# Patient Record
Sex: MALE | Race: BLACK OR AFRICAN AMERICAN | Employment: UNEMPLOYED | URBAN - METROPOLITAN AREA
[De-identification: names, ages, dates, MRNs, and addresses within clinical notes are randomized per-mention and may not be internally consistent; named-entity substitution may affect disease eponyms.]

---
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
## 2024-01-08 ENCOUNTER — HOSPITAL ENCOUNTER (OUTPATIENT)
Age: 19
Discharge: HOME | End: 2024-01-08
Payer: MEDICAID

## 2024-01-08 DIAGNOSIS — D69.6 THROMBOCYTOPENIA (HCC): ICD-10-CM

## 2024-01-08 LAB
BASOPHILS # BLD: 0.03 K/UL (ref 0–0.2)
BASOPHILS NFR BLD: 1 % (ref 0–2)
EOSINOPHIL # BLD: <0.03 K/UL (ref 0–0.44)
EOSINOPHILS RELATIVE PERCENT: 1 % (ref 1–4)
ERYTHROCYTE [DISTWIDTH] IN BLOOD BY AUTOMATED COUNT: 12.8 % (ref 11.8–14.4)
HCT VFR BLD AUTO: 42.9 % (ref 40.7–50.3)
HGB BLD-MCNC: 14.5 G/DL (ref 13–17)
IMM GRANULOCYTES # BLD AUTO: <0.03 K/UL (ref 0–0.3)
IMM GRANULOCYTES NFR BLD: 0 %
LYMPHOCYTES NFR BLD: 1.95 K/UL (ref 1.2–5.2)
LYMPHOCYTES RELATIVE PERCENT: 47 % (ref 25–45)
MCH RBC QN AUTO: 31.5 PG (ref 25.2–33.5)
MCHC RBC AUTO-ENTMCNC: 33.8 G/DL (ref 28.4–34.8)
MCV RBC AUTO: 93.3 FL (ref 82.6–102.9)
MONOCYTES NFR BLD: 0.37 K/UL (ref 0.1–1.4)
MONOCYTES NFR BLD: 9 % (ref 2–8)
NEUTROPHILS NFR BLD: 42 % (ref 34–64)
NEUTS SEG NFR BLD: 1.74 K/UL (ref 1.8–8)
NRBC BLD-RTO: 0 PER 100 WBC
PLATELET # BLD AUTO: 97 K/UL (ref 138–453)
PMV BLD AUTO: 9.9 FL (ref 8.1–13.5)
RBC # BLD AUTO: 4.6 M/UL (ref 4.21–5.77)
WBC OTHER # BLD: 4.1 K/UL (ref 4.5–13.5)

## 2024-01-08 PROCEDURE — 36415 COLL VENOUS BLD VENIPUNCTURE: CPT

## 2024-01-08 PROCEDURE — 85025 COMPLETE CBC W/AUTO DIFF WBC: CPT

## 2024-01-15 ENCOUNTER — TELEPHONE (OUTPATIENT)
Dept: ONCOLOGY | Age: 19
End: 2024-01-15

## 2024-01-15 NOTE — TELEPHONE ENCOUNTER
Spoke with PT and pt is going to call the office back.  Need to schedule a f/u appt with Dr Wasserman with [facility].   would like to see pt around the 31st of January.    Electronically signed by Lauren Vences on 1/15/2024 at 9:34 AM

## 2024-01-25 DIAGNOSIS — E53.8 B12 DEFICIENCY: ICD-10-CM

## 2024-01-25 DIAGNOSIS — D69.6 THROMBOCYTOPENIA (HCC): Primary | ICD-10-CM

## 2024-02-23 ENCOUNTER — TELEPHONE (OUTPATIENT)
Dept: ONCOLOGY | Age: 19
End: 2024-02-23

## 2024-02-23 ENCOUNTER — HOSPITAL ENCOUNTER (OUTPATIENT)
Age: 19
Discharge: HOME | End: 2024-02-23
Payer: MEDICAID

## 2024-02-23 ENCOUNTER — OFFICE VISIT (OUTPATIENT)
Dept: ONCOLOGY | Age: 19
End: 2024-02-23
Payer: MEDICAID

## 2024-02-23 VITALS
OXYGEN SATURATION: 99 % | HEART RATE: 82 BPM | SYSTOLIC BLOOD PRESSURE: 103 MMHG | WEIGHT: 118.7 LBS | BODY MASS INDEX: 17.03 KG/M2 | DIASTOLIC BLOOD PRESSURE: 53 MMHG | TEMPERATURE: 97.6 F

## 2024-02-23 DIAGNOSIS — D69.6 THROMBOCYTOPENIA (HCC): ICD-10-CM

## 2024-02-23 DIAGNOSIS — E53.8 B12 DEFICIENCY: ICD-10-CM

## 2024-02-23 DIAGNOSIS — D69.6 THROMBOCYTOPENIA (HCC): Primary | ICD-10-CM

## 2024-02-23 LAB
BASOPHILS # BLD: 0 K/UL (ref 0–0.2)
BASOPHILS NFR BLD: 1 % (ref 0–2)
EOSINOPHIL # BLD: 0 K/UL (ref 0–0.4)
EOSINOPHILS RELATIVE PERCENT: 1 % (ref 1–4)
ERYTHROCYTE [DISTWIDTH] IN BLOOD BY AUTOMATED COUNT: 13.6 % (ref 12.5–15.4)
HCT VFR BLD AUTO: 38.7 % (ref 41–53)
HGB BLD-MCNC: 13.4 G/DL (ref 13.5–17.5)
LYMPHOCYTES NFR BLD: 1.4 K/UL (ref 1.2–5.2)
LYMPHOCYTES RELATIVE PERCENT: 34 % (ref 25–45)
MCH RBC QN AUTO: 31.9 PG (ref 26–34)
MCHC RBC AUTO-ENTMCNC: 34.6 G/DL (ref 31–37)
MCV RBC AUTO: 92 FL (ref 80–100)
MONOCYTES NFR BLD: 0.3 K/UL (ref 0.1–1.4)
MONOCYTES NFR BLD: 8 % (ref 2–8)
NEUTROPHILS NFR BLD: 56 % (ref 34–64)
NEUTS SEG NFR BLD: 2.4 K/UL (ref 1.8–8)
PLATELET # BLD AUTO: 102 K/UL (ref 140–450)
PMV BLD AUTO: 7.5 FL (ref 6–12)
RBC # BLD AUTO: 4.2 M/UL (ref 4.5–5.9)
WBC OTHER # BLD: 4.3 K/UL (ref 4.5–13.5)

## 2024-02-23 PROCEDURE — 99214 OFFICE O/P EST MOD 30 MIN: CPT | Performed by: INTERNAL MEDICINE

## 2024-02-23 PROCEDURE — 36415 COLL VENOUS BLD VENIPUNCTURE: CPT

## 2024-02-23 PROCEDURE — 99211 OFF/OP EST MAY X REQ PHY/QHP: CPT | Performed by: INTERNAL MEDICINE

## 2024-02-23 PROCEDURE — 85025 COMPLETE CBC W/AUTO DIFF WBC: CPT

## 2024-02-23 NOTE — TELEPHONE ENCOUNTER
AVS from 2/23/24      Rv in 3 months with [facility]      Rv at  [facility]       *rv will be sched @[facility] w [facility]   *AVS faxed to location pt givein office number      PT was given AVS

## 2024-02-23 NOTE — PROGRESS NOTES
Patient Name: Gina French    Patient's age: 18 y.o., 2005      CHIEF COMPLAINT:  Review results.  Thrombocytopenia    History Obtained From:  patient, mother, electronic medical record    Interval history:  Patient presents to the clinic accompanied by his mother to discuss results of his blood workup.  Patient CBC shows platelet count 102,000.  WBC count on lower side.  Denies excessive bruising or bleeding.    During this visit patient's allergy, social, medical, surgical history and medications were reviewed and updated.    HISTORY OF PRESENT ILLNESS:      The patient is a 18 y.o. Afro-American male who initially presented to the ER with complaints of 3 to 4 days of nausea vomiting generalized abdominal pain and diarrhea.  Patient denies fever or shortness of breath.  Patient initially seen in the ER discharged home once patient was feeling better however once patient's labs came back patient was noted to have a platelet count of 19,000 with immature platelet fraction of 11.5%.  Subsequently patient was advised to come back to the ER.  Patient now admitted to the hospital.  Oncology team consulted for management of severe thrombocytopenia.    Patient denies drenching night sweats swollen glands unintentional weight loss.  Repeat platelet count this morning is 20,000.  Peripheral blood smear is pending.  Records from outside facility including CBC from September 2022 shows platelet count of 67,000 at that time.    Past Medical History: Denies history of diabetes or hypertension.   Past Surgical History:   has a past surgical history that includes Circumcision.     Medications:    Prior to Admission medications    Medication Sig Start Date End Date Taking? Authorizing Provider   latanoprost (XALATAN) 0.005 % ophthalmic solution instill 1 drop into both eyes every evening 10/3/20  Yes Provider, MD Jamie   cyanocobalamin ([facility] VITAMIN B12) 1000 MCG tablet Take 1 tablet by mouth daily  Patient

## 2024-03-09 ENCOUNTER — HOSPITAL ENCOUNTER (EMERGENCY)
Age: 19
Discharge: HOME | End: 2024-03-09
Attending: EMERGENCY MEDICINE
Payer: MEDICAID

## 2024-03-09 VITALS
HEIGHT: 70 IN | RESPIRATION RATE: 16 BRPM | HEART RATE: 77 BPM | OXYGEN SATURATION: 99 % | DIASTOLIC BLOOD PRESSURE: 67 MMHG | WEIGHT: 156.75 LBS | BODY MASS INDEX: 22.44 KG/M2 | TEMPERATURE: 97.7 F | SYSTOLIC BLOOD PRESSURE: 103 MMHG

## 2024-03-09 DIAGNOSIS — R10.84 GENERALIZED ABDOMINAL PAIN: Primary | ICD-10-CM

## 2024-03-09 LAB
ALBUMIN SERPL-MCNC: 4.6 G/DL (ref 3.5–5.2)
ALBUMIN/GLOB SERPL: 1 {RATIO} (ref 1–2.5)
ALP SERPL-CCNC: 67 U/L (ref 40–129)
ALT SERPL-CCNC: 9 U/L (ref 10–50)
ANION GAP SERPL CALCULATED.3IONS-SCNC: 10 MMOL/L (ref 9–16)
AST SERPL-CCNC: 21 U/L (ref 10–50)
BILIRUB DIRECT SERPL-MCNC: 0.3 MG/DL (ref 0–0.3)
BILIRUB INDIRECT SERPL-MCNC: 0.8 MG/DL (ref 0–1)
BILIRUB SERPL-MCNC: 1.1 MG/DL (ref 0–1.2)
BUN SERPL-MCNC: 8 MG/DL (ref 6–20)
CALCIUM SERPL-MCNC: 9.7 MG/DL (ref 8.6–10.4)
CHLORIDE SERPL-SCNC: 102 MMOL/L (ref 98–107)
CO2 SERPL-SCNC: 27 MMOL/L (ref 20–31)
CREAT SERPL-MCNC: 1.1 MG/DL (ref 0.7–1.2)
ERYTHROCYTE [DISTWIDTH] IN BLOOD BY AUTOMATED COUNT: 12.6 % (ref 11.8–14.4)
FLUAV AG SPEC QL: NEGATIVE
FLUBV AG SPEC QL: NEGATIVE
GFR SERPL CREATININE-BSD FRML MDRD: >60 ML/MIN/1.73M2
GLOBULIN SER CALC-MCNC: 3.1 G/DL
GLUCOSE SERPL-MCNC: 86 MG/DL (ref 74–99)
HCT VFR BLD AUTO: 41.5 % (ref 40.7–50.3)
HGB BLD-MCNC: 14.3 G/DL (ref 13–17)
LIPASE SERPL-CCNC: 13 U/L (ref 13–60)
MCH RBC QN AUTO: 31.4 PG (ref 25.2–33.5)
MCHC RBC AUTO-ENTMCNC: 34.5 G/DL (ref 28.4–34.8)
MCV RBC AUTO: 91 FL (ref 82.6–102.9)
NRBC BLD-RTO: 0 PER 100 WBC
PLATELET # BLD AUTO: ABNORMAL K/UL (ref 138–453)
PLATELET, FLUORESCENCE: 92 K/UL (ref 138–453)
PLATELETS.RETICULATED NFR BLD AUTO: 4.8 % (ref 1.1–10.3)
POTASSIUM SERPL-SCNC: 4.2 MMOL/L (ref 3.7–5.3)
PROT SERPL-MCNC: 7.7 G/DL (ref 6.6–8.7)
RBC # BLD AUTO: 4.56 M/UL (ref 4.21–5.77)
SARS-COV-2 RDRP RESP QL NAA+PROBE: NOT DETECTED
SODIUM SERPL-SCNC: 139 MMOL/L (ref 136–145)
SPECIMEN DESCRIPTION: NORMAL
WBC OTHER # BLD: 5.4 K/UL (ref 4.5–13.5)

## 2024-03-09 PROCEDURE — 80076 HEPATIC FUNCTION PANEL: CPT

## 2024-03-09 PROCEDURE — 87635 SARS-COV-2 COVID-19 AMP PRB: CPT

## 2024-03-09 PROCEDURE — 96361 HYDRATE IV INFUSION ADD-ON: CPT | Performed by: EMERGENCY MEDICINE

## 2024-03-09 PROCEDURE — 80048 BASIC METABOLIC PNL TOTAL CA: CPT

## 2024-03-09 PROCEDURE — 87804 INFLUENZA ASSAY W/OPTIC: CPT

## 2024-03-09 PROCEDURE — 83690 ASSAY OF LIPASE: CPT

## 2024-03-09 PROCEDURE — 96374 THER/PROPH/DIAG INJ IV PUSH: CPT | Performed by: EMERGENCY MEDICINE

## 2024-03-09 PROCEDURE — 6360000002 HC RX W HCPCS

## 2024-03-09 PROCEDURE — 2580000003 HC RX 258

## 2024-03-09 PROCEDURE — 85055 RETICULATED PLATELET ASSAY: CPT

## 2024-03-09 PROCEDURE — 85027 COMPLETE CBC AUTOMATED: CPT

## 2024-03-09 PROCEDURE — 99284 EMERGENCY DEPT VISIT MOD MDM: CPT | Performed by: EMERGENCY MEDICINE

## 2024-03-09 RX ORDER — DICYCLOMINE HYDROCHLORIDE 10 MG/1
10 CAPSULE ORAL
Qty: 40 CAPSULE | Refills: 0 | Status: SHIPPED | OUTPATIENT
Start: 2024-03-09 | End: 2024-03-19

## 2024-03-09 RX ORDER — KETOROLAC TROMETHAMINE 30 MG/ML
30 INJECTION, SOLUTION INTRAMUSCULAR; INTRAVENOUS ONCE
Status: COMPLETED | OUTPATIENT
Start: 2024-03-09 | End: 2024-03-09

## 2024-03-09 RX ORDER — 0.9 % SODIUM CHLORIDE 0.9 %
1000 INTRAVENOUS SOLUTION INTRAVENOUS ONCE
Status: COMPLETED | OUTPATIENT
Start: 2024-03-09 | End: 2024-03-09

## 2024-03-09 RX ADMIN — KETOROLAC TROMETHAMINE 30 MG: 30 INJECTION, SOLUTION INTRAMUSCULAR; INTRAVENOUS at 16:41

## 2024-03-09 RX ADMIN — SODIUM CHLORIDE 1000 ML: 9 INJECTION, SOLUTION INTRAVENOUS at 16:40

## 2024-03-09 ASSESSMENT — PAIN DESCRIPTION - DESCRIPTORS: DESCRIPTORS: ACHING

## 2024-03-09 ASSESSMENT — PAIN DESCRIPTION - LOCATION: LOCATION: ABDOMEN

## 2024-03-09 ASSESSMENT — PAIN SCALES - GENERAL: PAINLEVEL_OUTOF10: 4

## 2024-03-09 NOTE — ED NOTES
The following labs were labeled with appropriate pt sticker and tubed to lab:     [] Blue     [x] Lavender   [] on ice  [x] Green/yellow  [] Green/black [] on ice  [] Grey  [] on ice  [] Yellow  [] Red  [] Pink  [] Type/ Screen  [] ABG  [] VBG    [x] COVID-19 swab    [x] Rapid  [] PCR  [x] Flu swab  [] Peds Viral Panel     [] Urine Sample  [] Fecal Sample  [] Pelvic Cultures  [] Blood Cultures  [] X 2  [] STREP Cultures  [] Wound Cultures

## 2024-03-09 NOTE — DISCHARGE INSTRUCTIONS
You are seen here for generalized abdominal pain.  We have evaluated you, and found that your previous condition ITP, is affecting her platelets, but it is not severe enough to give you steroids at this time.  We do suggest you follow-up with your primary care provider soon as possible, and follow-up with your hematologist as soon as possible.  You may need steroids and/or other treatment, and they will be able to help you more.

## 2024-03-09 NOTE — ED NOTES
Pt. Arrives to ED via private auto for c/o abdominal pain and diarrhea.  Pt states he has had abdominal pain for the past coupe of days.  Pt states he hasn't been able to eat and has been having diarrhea.

## 2024-03-09 NOTE — ED PROVIDER NOTES
[facility] ED  Emergency Department Encounter  Emergency Medicine Resident     Pt Name:Gina French  MRN: [id number]  Birthdate 2005  Date of evaluation: 3/9/24  PCP:  Aissatou Noriega  Note Started: 4:14 PM EST      CHIEF COMPLAINT       Chief Complaint   Patient presents with    Abdominal Pain     Pt states he has had abdominalpain for the past couple of days. Pt states he hasn't been able to eat. Pt is also complaining of diarrhea        HISTORY OF PRESENT ILLNESS  (Location/Symptom, Timing/Onset, Context/Setting, Quality, Duration, Modifying Factors, Severity.)      Gina French is a 18 y.o. male with significant previous medical history of severe thrombocytopenia needing hematology consult with likely diagnosis of ITP and B12 deficiency, who presents with abdominal pain with subjective fevers and chills, with decreased PO intake for approximately 2 days. Patient says that there was no inciting incident, but that he has some nonspecific abdominal pain and \"fullness in my chest\" that is making him PO intolerant and uncomfortable.     There is significant previous visit end of 2023 with a hematology note that stated he most likely has ITP and low B12 level, and to continue to take steroids. Will evaluate for similar or for viral/gastrointestinal source of abdominal pain.    PAST MEDICAL / SURGICAL / SOCIAL / FAMILY HISTORY      has no past medical history on file.       has a past surgical history that includes Circumcision.      Social History     Socioeconomic History    Marital status: Single     Spouse name: Not on file    Number of children: Not on file    Years of education: Not on file    Highest education level: Not on file   Occupational History    Not on file   Tobacco Use    Smoking status: Never     Passive exposure: Yes    Smokeless tobacco: Not on file   Vaping Use    Vaping Use: Some days   Substance and Sexual Activity    Alcohol use: Yes     Comment: socially  6 hours as needed for Pain  Patient not taking: Reported on 12/17/2023 10/8/19   Braulio Chapa DO   ibuprofen (CHILDRENS ADVIL) 100 MG/5ML suspension Take 15.9 mLs by mouth every 6 hours as needed for Pain or Fever.  Patient not taking: Reported on 12/17/2023 7/2/13   Xin Vidal DO         REVIEW OF SYSTEMS       Review of Systems   Constitutional:  Positive for fever (Subjective). Negative for chills.   Respiratory:          Chest fullness   Cardiovascular:  Negative for chest pain and palpitations.   Gastrointestinal:  Positive for abdominal pain (Generalized), diarrhea and nausea. Negative for vomiting.   Genitourinary:  Negative for dysuria, flank pain and testicular pain.   Musculoskeletal:  Negative for arthralgias and back pain.   Skin:  Negative for rash.   Neurological:  Negative for weakness and light-headedness.       PHYSICAL EXAM      INITIAL VITALS:   /67   Pulse 77   Temp 97.7 °F (36.5 °C) (Oral)   Resp 16   Ht 1.778 m (5' 10\")   Wt 71.1 kg (156 lb 12 oz)   SpO2 99%   BMI 22.49 kg/m²     Physical Exam  Constitutional:       General: He is not in acute distress.     Appearance: He is not ill-appearing.   Cardiovascular:      Rate and Rhythm: Normal rate and regular rhythm.   Pulmonary:      Effort: Pulmonary effort is normal.      Breath sounds: Normal breath sounds.   Abdominal:      General: There is no distension.      Palpations: Abdomen is soft.      Tenderness: There is no abdominal tenderness. There is no guarding or rebound.   Skin:     General: Skin is warm and dry.      Capillary Refill: Capillary refill takes less than 2 seconds.   Neurological:      General: No focal deficit present.      Mental Status: He is alert.           DDX/DIAGNOSTIC RESULTS / EMERGENCY DEPARTMENT COURSE / MDM     Medical Decision Making  This is a 19 yo male with previous history of possible ITP and B12 deficiency that presents for abdominal pain with diarrhea. The patient has similar symptoms to

## 2024-03-09 NOTE — ED PROVIDER NOTES
with diarrhea.   Will obtain abdominal laboratories as well as COVID and flu swabs.   Treat symptomatically and reevaluate after    Of note after chart review patient had a platelets of 20,003 months ago and has slowly been improving and will obviously be rechecking this on the current CBC.        Critical Care  None      (Please note that portions of this note were completed with a voice recognition program. Efforts were made to edit the dictations but occasionally words are mis-transcribed. Whenever words are used in this note in any gender, they shall be construed as though they were used in the gender appropriate to the circumstances; and whenever words are used in this note in the singular or plural form, they shall be construed as though they were used in the form appropriate to the circumstances.)    Benjie Howard MD [facility]  Attending Emergency Medicine Physician           Benjie Howard MD  03/09/24 1610       Benjie Howard MD  03/09/24 1275

## 2024-03-10 NOTE — DISCHARGE INSTR - COC
Continuity of Care Form    Patient Name: Gina French   :  2005  MRN:  [id number]    Admit date:  3/9/2024  Discharge date:  ***    Code Status Order: Prior   Advance Directives:     Admitting Physician:  No admitting provider for patient encounter.  PCP: Aissatou Noriega    Discharging Nurse: ***  Discharging Hospital Unit/Room#: 47PED/47PED  Discharging Unit Phone Number: ***    Emergency Contact:   Extended Emergency Contact Information  Primary Emergency Contact: Ron Barreto  Address: [address] [place] [address]  Home Phone: [phone]  Mobile Phone: [phone]  Relation: Parent   needed? No    Past Surgical History:  Past Surgical History:   Procedure Laterality Date    CIRCUMCISION         Immunization History:     There is no immunization history on file for this patient.    Active Problems:  Patient Active Problem List   Diagnosis Code    Thrombocytopenia (HCC) D69.6       Isolation/Infection:   Isolation            No Isolation          Patient Infection Status       None to display                     Nurse Assessment:  Last Vital Signs: /67   Pulse 77   Temp 97.7 °F (36.5 °C) (Oral)   Resp 16   Ht 1.778 m (5' 10\")   Wt 71.1 kg (156 lb 12 oz)   SpO2 99%   BMI 22.49 kg/m²     Last documented pain score (0-10 scale): Pain Level: 4  Last Weight:   Wt Readings from Last 1 Encounters:   24 71.1 kg (156 lb 12 oz) (55 %, Z= 0.12)*     * Growth percentiles are based on [facility] (Boys, 2-20 Years) data.     Mental Status:  {IP PT MENTAL STATUS:}    IV Access:  { CATHY IV ACCESS:928769067}    Nursing Mobility/ADLs:  Walking   {CHP DME ADLs:027530162}  Transfer  {CHP DME ADLs:744309884}  Bathing  {CHP DME ADLs:548448769}  Dressing  {CHP DME ADLs:704437254}  Toileting  {CHP DME ADLs:456531947}  Feeding  {CHP DME ADLs:929631977}  Med Admin  {CHP DME ADLs:353800158}  Med Delivery   { CATHY MED Delivery:737646313}    Wound Care Documentation and Therapy:       Labs or Other Treatments After Discharge: ***    Physician Certification: I certify the above information and transfer of Gina French  is necessary for the continuing treatment of the diagnosis listed and that he requires {Admit to Appropriate Level of Care:82791} for {GREATER/LESS:247794480} 30 days.     Update Admission H&P: {CHP DME Changes in HandP:119605153}    PHYSICIAN SIGNATURE:  {Esignature:544924790}

## 2024-03-14 ENCOUNTER — PATIENT MESSAGE (OUTPATIENT)
Dept: ONCOLOGY | Age: 19
End: 2024-03-14

## 2024-03-18 NOTE — TELEPHONE ENCOUNTER
From: Gina French  To: Dr. Deon Wasserman  Sent: 3/14/2024 2:20 PM EDT  Subject: May you fax my paperwork?    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   The name of the clinic is [facility], [address] [place] [address] [id number] Phone: [phone] Fax: [phone]   Email: [email or web address]    Please respond at your greatest convenience

## 2024-04-11 VITALS
HEART RATE: 78 BPM | TEMPERATURE: 97.5 F | DIASTOLIC BLOOD PRESSURE: 67 MMHG | SYSTOLIC BLOOD PRESSURE: 129 MMHG | OXYGEN SATURATION: 100 % | RESPIRATION RATE: 16 BRPM

## 2024-04-11 PROCEDURE — 99283 EMERGENCY DEPT VISIT LOW MDM: CPT

## 2024-04-12 ENCOUNTER — HOSPITAL ENCOUNTER (EMERGENCY)
Age: 19
Discharge: HOME | End: 2024-04-12
Attending: EMERGENCY MEDICINE
Payer: MEDICAID
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
## 2024-04-12 PROCEDURE — 6370000000 HC RX 637 (ALT 250 FOR IP)

## 2024-04-12 RX ORDER — AMOXICILLIN AND CLAVULANATE POTASSIUM 875; 125 MG/1; MG/1
1 TABLET, FILM COATED ORAL ONCE
Status: COMPLETED | OUTPATIENT
Start: 2024-04-12 | End: 2024-04-12

## 2024-04-12 RX ORDER — AMOXICILLIN AND CLAVULANATE POTASSIUM 875; 125 MG/1; MG/1
1 TABLET, FILM COATED ORAL 2 TIMES DAILY
Qty: 20 TABLET | Refills: 0 | Status: SHIPPED | OUTPATIENT
Start: 2024-04-12 | End: 2024-04-22

## 2024-04-12 RX ORDER — ACETAMINOPHEN 500 MG
1000 TABLET ORAL ONCE
Status: COMPLETED | OUTPATIENT
Start: 2024-04-12 | End: 2024-04-12

## 2024-04-12 RX ORDER — IBUPROFEN 400 MG/1
400 TABLET ORAL ONCE
Status: COMPLETED | OUTPATIENT
Start: 2024-04-12 | End: 2024-04-12

## 2024-04-12 RX ADMIN — AMOXICILLIN AND CLAVULANATE POTASSIUM 1 TABLET: 875; 125 TABLET, FILM COATED ORAL at 00:47

## 2024-04-12 RX ADMIN — ACETAMINOPHEN 1000 MG: 500 TABLET ORAL at 00:47

## 2024-04-12 RX ADMIN — IBUPROFEN 400 MG: 400 TABLET, FILM COATED ORAL at 00:47
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
## 2024-06-10 ENCOUNTER — TELEPHONE (OUTPATIENT)
Dept: ONCOLOGY | Age: 19
End: 2024-06-10

## 2024-06-10 NOTE — TELEPHONE ENCOUNTER
WRITER CALLED PT TO RESCHEDULE APPT W/ DR. BALDERAS ON 6/24/24 DUE TO HIM ON CALL. PT DID NOT ANSWER & MESSAGE WAS LEFT FOR PT TO CALL BACK

## 2024-07-01 ENCOUNTER — OFFICE VISIT (OUTPATIENT)
Dept: ONCOLOGY | Age: 19
End: 2024-07-01
Payer: MEDICAID

## 2024-07-01 ENCOUNTER — HOSPITAL ENCOUNTER (OUTPATIENT)
Age: 19
Setting detail: SPECIMEN
Discharge: HOME | End: 2024-07-01
Payer: MEDICAID

## 2024-07-01 ENCOUNTER — TELEPHONE (OUTPATIENT)
Dept: ONCOLOGY | Age: 19
End: 2024-07-01

## 2024-07-01 VITALS
HEART RATE: 60 BPM | SYSTOLIC BLOOD PRESSURE: 111 MMHG | TEMPERATURE: 97 F | BODY MASS INDEX: 21.51 KG/M2 | DIASTOLIC BLOOD PRESSURE: 68 MMHG | WEIGHT: 149.9 LBS | RESPIRATION RATE: 16 BRPM

## 2024-07-01 DIAGNOSIS — D69.6 THROMBOCYTOPENIA (HCC): ICD-10-CM

## 2024-07-01 DIAGNOSIS — E53.8 B12 DEFICIENCY: ICD-10-CM

## 2024-07-01 DIAGNOSIS — D69.6 THROMBOCYTOPENIA (HCC): Primary | ICD-10-CM

## 2024-07-01 LAB
BASOPHILS # BLD: <0.03 K/UL (ref 0–0.2)
BASOPHILS NFR BLD: 0 % (ref 0–2)
EOSINOPHIL # BLD: 0.05 K/UL (ref 0–0.44)
EOSINOPHILS RELATIVE PERCENT: 1 % (ref 1–4)
ERYTHROCYTE [DISTWIDTH] IN BLOOD BY AUTOMATED COUNT: 12.8 % (ref 11.8–14.4)
HCT VFR BLD AUTO: 41.7 % (ref 40.7–50.3)
HGB BLD-MCNC: 14.2 G/DL (ref 13–17)
IMM GRANULOCYTES # BLD AUTO: 0 K/UL (ref 0–0.3)
IMM GRANULOCYTES NFR BLD: 0 %
LYMPHOCYTES NFR BLD: 2.2 K/UL (ref 1.2–5.2)
LYMPHOCYTES RELATIVE PERCENT: 46 % (ref 25–45)
MCH RBC QN AUTO: 31.2 PG (ref 25.2–33.5)
MCHC RBC AUTO-ENTMCNC: 34.1 G/DL (ref 28.4–34.8)
MCV RBC AUTO: 91.6 FL (ref 82.6–102.9)
MONOCYTES NFR BLD: 0.45 K/UL (ref 0.1–1.4)
MONOCYTES NFR BLD: 9 % (ref 2–8)
NEUTROPHILS NFR BLD: 44 % (ref 34–64)
NEUTS SEG NFR BLD: 2.12 K/UL (ref 1.8–8)
NRBC BLD-RTO: 0 PER 100 WBC
PLATELET # BLD AUTO: 106 K/UL (ref 138–453)
PMV BLD AUTO: 9.1 FL (ref 8.1–13.5)
RBC # BLD AUTO: 4.55 M/UL (ref 4.21–5.77)
VIT B12 SERPL-MCNC: 201 PG/ML (ref 232–1245)
WBC OTHER # BLD: 4.8 K/UL (ref 4.5–13.5)

## 2024-07-01 PROCEDURE — 85025 COMPLETE CBC W/AUTO DIFF WBC: CPT

## 2024-07-01 PROCEDURE — 36415 COLL VENOUS BLD VENIPUNCTURE: CPT

## 2024-07-01 PROCEDURE — 82607 VITAMIN B-12: CPT

## 2024-07-01 PROCEDURE — 99211 OFF/OP EST MAY X REQ PHY/QHP: CPT | Performed by: INTERNAL MEDICINE

## 2024-07-01 NOTE — PROGRESS NOTES
Platelets See Reflexed IPF Result 138 - 453 k/uL    Platelet, Fluorescence 92 (L) 138 - 453 k/uL    Platelet, Immature Fraction 4.8 1.1 - 10.3 %    NRBC Automated 0.0 0.0 per 100 WBC   Basic Metabolic Panel   Result Value Ref Range    Sodium 139 136 - 145 mmol/L    Potassium 4.2 3.7 - 5.3 mmol/L    Chloride 102 98 - 107 mmol/L    CO2 27 20 - 31 mmol/L    Anion Gap 10 9 - 16 mmol/L    Glucose 86 74 - 99 mg/dL    BUN 8 6 - 20 mg/dL    Creatinine 1.1 0.70 - 1.20 mg/dL    Est, Glom Filt Rate >60 >60 mL/min/1.73m2    Calcium 9.7 8.6 - 10.4 mg/dL   Lipase   Result Value Ref Range    Lipase 13 13 - 60 U/L   Hepatic Function Panel   Result Value Ref Range    Albumin 4.6 3.5 - 5.2 g/dL    Alkaline Phosphatase 67 40 - 129 U/L    ALT 9 (L) 10 - 50 U/L    AST 21 10 - 50 U/L    Total Bilirubin 1.1 0.00 - 1.20 mg/dL    Bilirubin, Direct 0.3 0.00 - 0.30 mg/dL    Bilirubin, Indirect 0.8 0.0 - 1.0 mg/dL    Total Protein 7.7 6.6 - 8.7 g/dL    Globulin 3.1 g/dL    Albumin/Globulin Ratio 1.0 1.0 - 2.5     US LIVER SPLEEN    Result Date: 12/17/2023  EXAMINATION: RIGHT UPPER QUADRANT ULTRASOUND 12/17/2023 10:18 am COMPARISON: None. HISTORY: ORDERING SYSTEM PROVIDED HISTORY: assess for hepatosplenomegaly TECHNOLOGIST PROVIDED HISTORY: assess for hepatosplenomegaly FINDINGS: LIVER:  Liver demonstrates normal echogenicity.  The liver has a normal contour.  No sonographic mass lesion.  Hepatopetal flow is noted in the portal vein. BILIARY SYSTEM:  Gallbladder is unremarkable without evidence of pericholecystic fluid, wall thickening or stones.  Negative sonographic Amaral's sign. Common bile duct is within normal limits measuring 3 mm. KIDNEYS: The right and left kidney are unremarkable.  Echogenicity is normal. No hydronephrosis. PANCREAS:  Visualized portions of the pancreas are unremarkable. OTHER: The spleen measures 10.3 x 3.9 x 5.0 cm.  Normal venous flow is directed away from the spleen.     Normal abdominal ultrasound.  No

## 2024-07-01 NOTE — PATIENT INSTRUCTIONS
Labs now   Rv in 6 months    Subjective:      Donna Fatima 

## 2024-07-01 NOTE — TELEPHONE ENCOUNTER
SHAMAREION HERE FOR FOLLOW UP   Labs now   Rv in 6 months   LABS ORDERED: VIT B12 & CBC   WRITER WILL CALL PT TO SCHEDULE ONCE CALENDAR IS MADE   LABS PRINTED AND DONE ON EXIT   AVS PRINTED AND GIVEN ON EXIT

## 2024-07-02 DIAGNOSIS — M54.9 BACK PAIN, UNSPECIFIED BACK LOCATION, UNSPECIFIED BACK PAIN LATERALITY, UNSPECIFIED CHRONICITY: Primary | ICD-10-CM

## 2024-07-03 ENCOUNTER — HOSPITAL ENCOUNTER (OUTPATIENT)
Dept: PHYSICAL THERAPY | Facility: CLINIC | Age: 19
Setting detail: THERAPIES SERIES
Discharge: HOME | End: 2024-07-03

## 2024-07-03 PROCEDURE — 97110 THERAPEUTIC EXERCISES: CPT

## 2024-07-03 PROCEDURE — 97161 PT EVAL LOW COMPLEX 20 MIN: CPT
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
## 2024-07-08 ENCOUNTER — HOSPITAL ENCOUNTER (OUTPATIENT)
Dept: PHYSICAL THERAPY | Facility: CLINIC | Age: 19
Setting detail: THERAPIES SERIES
Discharge: HOME | End: 2024-07-08

## 2024-07-08 PROCEDURE — 97110 THERAPEUTIC EXERCISES: CPT

## 2024-07-08 PROCEDURE — 97140 MANUAL THERAPY 1/> REGIONS: CPT

## 2024-07-08 NOTE — FLOWSHEET NOTE
[] [facility]  Outpatient Rehabilitation &  Therapy  [address]  [phone]  [phone] [x] [facility]  Outpatient Rehabilitation &  Therapy  [address]  P: [phone]  F: [phone] [] [facility]  Outpatient Rehabilitation &  Therapy  [address]  P: [phone]  F: [phone] [] [facility]  Outpatient Rehabilitation &  Therapy  [address]  [phone]  [phone] [] [facility]  Outpatient Rehabilitation &  Therapy  [address]   P: [phone]  F: [phone]  [] [facility]  Outpatient Rehabilitation &  Therapy  [address]  P: [phone]  F: [phone] [] [facility]  Outpatient Rehabilitation &  Therapy  [address]  P: [phone]  F: [phone] [] [facility]  Outpatient Rehabilitation &  Therapy  [address]  P: [phone]  F: [phone] [] [facility]  Outpatient Rehabilitation &  Therapy  [address]  P: [phone]  F: [phone]  [] [facility] Outpatient Rehabilitation &  Therapy  [address]  P: [phone]  F: [phone]     Physical Therapy Daily Treatment Note    Date:  2024  Patient Name:  Gina French    :  2005  MRN: [id number]  Physician: Deon Wasserman MD                              Insurance: [employer] [place] medicaid (Auth after 30 visits)  Medical Diagnosis: M54.9 (ICD-10-CM) - Back pain, unspecified back location, unspecified back pain laterality, unspecified chronicity                    Rehab Codes: M25.562, M62.830  Onset Date: 24               Next 's appt.: TBD    Visit# / total visits: 2/10; Progress note for Medicare patient due at visit 10    Cancels/No Shows: 0/0  Frequency:  1 x/week for 10 visits     Subjective:    Pain:

## 2024-07-18 ENCOUNTER — HOSPITAL ENCOUNTER (OUTPATIENT)
Dept: PHYSICAL THERAPY | Facility: CLINIC | Age: 19
Setting detail: THERAPIES SERIES
Discharge: HOME | End: 2024-07-18
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
## 2024-07-23 ENCOUNTER — HOSPITAL ENCOUNTER (OUTPATIENT)
Dept: PHYSICAL THERAPY | Facility: CLINIC | Age: 19
Setting detail: THERAPIES SERIES
Discharge: HOME | End: 2024-07-23
Payer: OTHER MISCELLANEOUS

## 2024-07-23 PROCEDURE — 97110 THERAPEUTIC EXERCISES: CPT

## 2024-07-23 PROCEDURE — 97140 MANUAL THERAPY 1/> REGIONS: CPT

## 2024-07-23 NOTE — FLOWSHEET NOTE
daily - 7 x weekly - 3 sets - 10 reps  - Supine Single Knee to Chest Stretch  - 1 x daily - 7 x weekly - 3 sets - 10 reps  - Dead Bug  - 1 x daily - 7 x weekly - 3 sets - 10 reps  - Seated Upper Trapezius Stretch  - 1 x daily - 7 x weekly - 3 sets - 10 reps  Date: 07/08/2024  Prepared by: Angelique Jeffries  - Seated Scapular Retraction  - 1 x daily - 7 x weekly - 1 sets - 10 reps - 5sec hold  - Gentle Levator Scapulae Stretch  - 1 x daily - 7 x weekly - 3 sets - 30sec hold  - Seated Cervical Retraction  - 1 x daily - 7 x weekly - 1 sets - 10 reps - 5sec hold  Date: 07/23/2024- provided blue tband   Prepared by: Angelique Jeffries  - Clamshell with Resistance  - 1 x daily - 7 x weekly - 2 sets - 10 reps  - Sidelying Hip Abduction with Resistance at Thighs  - 1 x daily - 7 x weekly - 2 sets - 10 reps  - Supine Bridge with Resistance Band  - 1 x daily - 7 x weekly - 2 sets - 10 reps - 5 sec hold  - Supine Dead Bug with Leg Extension  - 1 x daily - 7 x weekly - 1 sets - 10 reps      Plan: [x] Continue current frequency toward long and short term goals.    [] Specific Instructions for subsequent treatments:       Time In: 12:04 pm            Time Out: 12:58 pm     Electronically signed by:  Angelique Jeffries PTA

## 2024-07-25 ENCOUNTER — HOSPITAL ENCOUNTER (OUTPATIENT)
Dept: PHYSICAL THERAPY | Facility: CLINIC | Age: 19
Setting detail: THERAPIES SERIES
Discharge: HOME | End: 2024-07-25
Payer: OTHER MISCELLANEOUS

## 2024-07-25 NOTE — FLOWSHEET NOTE
[] [facility]  Outpatient Rehabilitation &  Therapy  [address]  [phone]  [phone] [x] [facility]  Outpatient Rehabilitation &  Therapy  [address]  P: [phone]  F: [phone] [] [facility]  Outpatient Rehabilitation &  Therapy  [address]  P: [phone]  F: [phone] [] [facility]  Outpatient Rehabilitation &  Therapy  [address]  [phone]  [phone] [] [facility]  Outpatient Rehabilitation &  Therapy  [address]   P: [phone]  F: [phone]  [] [facility]  Outpatient Rehabilitation &  Therapy  [address]  P: [phone]  F: [phone] [] [facility]  Outpatient Rehabilitation &  Therapy  [address]  P: [phone]  F: [phone] [] [facility]  Outpatient Rehabilitation &  Therapy  [address]  P: [phone]  F: [phone] [] [facility]  Outpatient Rehabilitation &  Therapy  [address]  P: [phone]  F: [phone]  [] [facility] Outpatient Rehabilitation &  Therapy  [address]  P: [phone]  F: [phone]     Therapy Cancel/No Show note    Date: 2024  Patient: Gina French  : 2005  MRN: [id number]    Cancels/No Shows to date: 2/0    For today's appointment patient:    [x]  Cancelled    [] Rescheduled appointment    [] No-show     Reason given by patient:    []  Patient ill    []  Conflicting appointment    [] No transportation      [] Conflict with work    [x] No reason given    [] Weather related    [] COVID-19    [] Other:      Comments:        [x] Next appointment was confirmed    Electronically signed by: Angelique Jeffries, PTA

## 2024-08-06 ENCOUNTER — HOSPITAL ENCOUNTER (OUTPATIENT)
Dept: PHYSICAL THERAPY | Facility: CLINIC | Age: 19
Setting detail: THERAPIES SERIES
Discharge: HOME | End: 2024-08-06
Payer: OTHER MISCELLANEOUS

## 2024-08-06 PROCEDURE — 97110 THERAPEUTIC EXERCISES: CPT

## 2024-08-06 NOTE — FLOWSHEET NOTE
HEP/Ed previously given.     Access Code: [email or web address]  URL: [email or web address]  Date: 07/03/2024  Prepared by: Merced Zarco  Exercises  - Supine Lower Trunk Rotation  - 1 x daily - 7 x weekly - 3 sets - 10 reps  - Supine Single Knee to Chest Stretch  - 1 x daily - 7 x weekly - 3 sets - 10 reps  - Dead Bug  - 1 x daily - 7 x weekly - 3 sets - 10 reps  - Seated Upper Trapezius Stretch  - 1 x daily - 7 x weekly - 3 sets - 10 reps  Date: 07/08/2024  Prepared by: Angelique Jeffries  - Seated Scapular Retraction  - 1 x daily - 7 x weekly - 1 sets - 10 reps - 5sec hold  - Gentle Levator Scapulae Stretch  - 1 x daily - 7 x weekly - 3 sets - 30sec hold  - Seated Cervical Retraction  - 1 x daily - 7 x weekly - 1 sets - 10 reps - 5sec hold  Date: 07/23/2024- provided blue tband   Prepared by: Angelique Jeffries  - Clamshell with Resistance  - 1 x daily - 7 x weekly - 2 sets - 10 reps  - Sidelying Hip Abduction with Resistance at Thighs  - 1 x daily - 7 x weekly - 2 sets - 10 reps  - Supine Bridge with Resistance Band  - 1 x daily - 7 x weekly - 2 sets - 10 reps - 5 sec hold  - Supine Dead Bug with Leg Extension  - 1 x daily - 7 x weekly - 1 sets - 10 reps      Plan: [x] Continue current frequency toward long and short term goals.    [] Specific Instructions for subsequent treatments:       Time In: 1:02 pm            Time Out: 1:59 pm     Electronically signed by:  Rob Garcia PTA

## 2024-08-08 ENCOUNTER — HOSPITAL ENCOUNTER (OUTPATIENT)
Dept: PHYSICAL THERAPY | Facility: CLINIC | Age: 19
Setting detail: THERAPIES SERIES
Discharge: HOME | End: 2024-08-08
Payer: OTHER MISCELLANEOUS

## 2024-08-08 PROCEDURE — 97110 THERAPEUTIC EXERCISES: CPT

## 2024-08-08 NOTE — FLOWSHEET NOTE
[] [facility]  Outpatient Rehabilitation &  Therapy  [address]  [phone]  [phone] [x] [facility]  Outpatient Rehabilitation &  Therapy  [address]  P: [phone]  F: [phone] [] [facility]  Outpatient Rehabilitation &  Therapy  [address]  P: [phone]  F: [phone] [] [facility]  Outpatient Rehabilitation &  Therapy  [address]  [phone]  [phone] [] [facility]  Outpatient Rehabilitation &  Therapy  [address]   P: [phone]  F: [phone]  [] [facility]  Outpatient Rehabilitation &  Therapy  [address]  P: [phone]  F: [phone] [] [facility]  Outpatient Rehabilitation &  Therapy  [address]  P: [phone]  F: [phone] [] [facility]  Outpatient Rehabilitation &  Therapy  [address]  P: [phone]  F: [phone] [] [facility]  Outpatient Rehabilitation &  Therapy  [address]  P: [phone]  F: [phone]  [] [facility] Outpatient Rehabilitation &  Therapy  [address]  P: [phone]  F: [phone]     Physical Therapy Daily Treatment Note    Date:  2024  Patient Name:  Gina French    :  2005  MRN: [id number]  Physician: Deon Wasserman MD                              Insurance: [employer] [place] medicaid (Auth after 30 visits)  Medical Diagnosis: M54.9 (ICD-10-CM) - Back pain, unspecified back location, unspecified back pain laterality, unspecified chronicity                    Rehab Codes: M25.562, M62.830  Onset Date: 24               Next 's appt.: TBD    Visit# / total visits: 5/10; Progress note for Medicare patient due at visit 10    Cancels/No Shows: 2/  Frequency:  1 x/week for 10 visits     Subjective:    Pain:

## 2024-08-13 ENCOUNTER — HOSPITAL ENCOUNTER (OUTPATIENT)
Dept: PHYSICAL THERAPY | Facility: CLINIC | Age: 19
Setting detail: THERAPIES SERIES
Discharge: HOME | End: 2024-08-13
Payer: OTHER MISCELLANEOUS

## 2024-08-13 PROCEDURE — 97110 THERAPEUTIC EXERCISES: CPT

## 2024-08-13 NOTE — FLOWSHEET NOTE
paloff press to progress strength. Instructed through core engagement throughout session. Educated on proper posture and lifting mechanics to encourage squatting and avoid increased twisting in back. Pt tolerated session well noting pain remains about the same post tx. Emphasized importance of daily HEP to maximize rehab potential.       [] No change.     [] Other:  [x] Patient would continue to benefit from skilled physical therapy services in order to:  decrease cervical, lumbar, and left knee pain, increase BLE strength, and increase cervical and lumbar ROM in order to return to  school and participate in recreational activities.       Problem list, as detailed above:   [x] ? Back Pain: 8/10 pain at worst                   [x] ? Cervical Pain: 8/10 pain at worst  [x] ? ROM: Decreased lumbar and cervical ROM        [x] ? Strength:  Decreased bilateral LE strength  [x] ? Function: 14% functionally impaired on the Oswestry low back pain scale, and 18% functionally impaired on the neck pain disability index     STG: (to be met in 8 treatments)  ? Pain: Pt must demo <5/10 cervical and lumbar pain levels to increase overall functional mobility.  ? ROM:Pt must demo full lumbar ROM in order to increase ability to perform household tasks.  ? Strength: Pt must demo 4+/5 bilateral LE strength to increase ability to perform ADLs/iADLS.  Patient to be independent with home exercise program as demonstrated by performance with correct form without cues.     LTG: (to be met in 12 treatments)  Pt must demo >10% functionally impaired  with Oswerstry low back pain scale and neck disability index to increase overall functional mobility.  Pt must demo <2/10 neck and back pain levels to increase overall functional mobility.  Pt must demo 5/5 BLE strength to increase ability to perform ADLs/iADLS.  Pt must demo full cervical ROM in order to increase ability to perform household tasks.        Patient goals: Decrease

## 2024-08-22 ENCOUNTER — HOSPITAL ENCOUNTER (OUTPATIENT)
Dept: PHYSICAL THERAPY | Facility: CLINIC | Age: 19
Setting detail: THERAPIES SERIES
Discharge: HOME | End: 2024-08-22
Payer: OTHER MISCELLANEOUS

## 2024-08-22 PROCEDURE — 97110 THERAPEUTIC EXERCISES: CPT

## 2024-08-22 PROCEDURE — 97140 MANUAL THERAPY 1/> REGIONS: CPT

## 2024-08-22 NOTE — FLOWSHEET NOTE
[x] Yes  [] No Location: Cervical and lumbar   Pain Rating: (0-10 scale) 4/10 neck, 5/10 LB   Pain altered Tx:  [x] No  [] Yes  Action:    Comments: Pt reports no pain when arriving to session today. Pt reports he is tried this session from working overnight. Pt reports he is going to try a chiropractor before his next session.       Objective:  Modalities:   Precautions:  Exercises:  Exercise Reps/ Time Weight/ Level Comments   Nu step 5' L2 UE/LE         Seated       UT stretch  2x30\"ea     Levator stretch  2x30\"ea      Cervical rotation  10xea      Chin tucks  10x5\"      Scap retraction  10x5\"      PB roll outs 5x5\"ea  Fwd/lat               Supine       LTR  10x10\"ea     SKTC  2x30\"ea     Dead Bugs  10xea     Bridges  10x5\"  Blue Min discomfort in LB, new 7/23    TrA+ shoulder flex 10x 4# MB          Sidelying    New 7/23    Clamshells  20xea Blue     Hip abduction  20xea  Blue above knees  Inc reps 8/8         Quadruped       Cat/cow 5x5\"ea  Some discomfort in L wrist    Becca pose 2x30\"     Thread the Needle X5 ea  New 8/8   Bird Dog X5 ea  New 8/8; some wrist discomfort   Fire hydrant  x10ea  New 8/13         Prone press up x                 Standing       Paloff press 10xea dbl  lime New 8/13   Rows, ext 10ea lime New 8/13   TB EXT +March + TrA  X10 ea Blue New 8/8         Other: Manual: via hypervolt to lumbar paraspinals, B UT/ rhomboids in seated x8 min NOT TODAY d/t not available      Specific Instructions for next treatment:  Lumbar and cervical ROM  STM to cervical and lumbar musculature  LE strengthening   Core Strengthening         Treatment Charges: Mins Units   []  Modalities     [x]  Ther Exercise 26 2   [x]  Manual Therapy 8 1   []  Ther Activities     []  Neuro Re-ed     []  Vasocompression     [] Gait     []  Other     Total Billable time 34 3       Assessment: [x] Progressing toward goals: Pt began session on nustep to increase bloodflow and ROM. Exercises were completed as listed above. Pt

## 2024-08-28 ENCOUNTER — HOSPITAL ENCOUNTER (OUTPATIENT)
Dept: PHYSICAL THERAPY | Facility: CLINIC | Age: 19
Setting detail: THERAPIES SERIES
Discharge: HOME | End: 2024-08-28
Payer: OTHER MISCELLANEOUS

## 2024-08-28 PROCEDURE — 97110 THERAPEUTIC EXERCISES: CPT

## 2024-08-28 NOTE — FLOWSHEET NOTE
Progressing toward goals: Began with increased resistance on nu step followed by mat stretching for flexibility. Progressed resisted strengthening this date. Incorporated push up plus and body blade to improve strength and stability. Provided verbal and tactile cueing as needed to ensure proper ms activation. Pt feeling good post tx. Again stressed the importance of HEP and proper lifting mechanics. Plan to check goals at next session.     [] No change.     [] Other:  [x] Patient would continue to benefit from skilled physical therapy services in order to:  decrease cervical, lumbar, and left knee pain, increase BLE strength, and increase cervical and lumbar ROM in order to return to  school and participate in recreational activities.       Problem list, as detailed above:   [x] ? Back Pain: 8/10 pain at worst                   [x] ? Cervical Pain: 8/10 pain at worst  [x] ? ROM: Decreased lumbar and cervical ROM        [x] ? Strength:  Decreased bilateral LE strength  [x] ? Function: 14% functionally impaired on the Oswestry low back pain scale, and 18% functionally impaired on the neck pain disability index     STG: (to be met in 8 treatments)  ? Pain: Pt must demo <5/10 cervical and lumbar pain levels to increase overall functional mobility.  ? ROM:Pt must demo full lumbar ROM in order to increase ability to perform household tasks.  ? Strength: Pt must demo 4+/5 bilateral LE strength to increase ability to perform ADLs/iADLS.  Patient to be independent with home exercise program as demonstrated by performance with correct form without cues.     LTG: (to be met in 12 treatments)  Pt must demo >10% functionally impaired  with Oswerstry low back pain scale and neck disability index to increase overall functional mobility.  Pt must demo <2/10 neck and back pain levels to increase overall functional mobility.  Pt must demo 5/5 BLE strength to increase ability to perform ADLs/iADLS.  Pt must demo full  cervical ROM in order to increase ability to perform household tasks.        Patient goals: Decrease pain    Pt. Education:  [x] Yes  [] No  [x] Reviewed Prior HEP/Ed  Method of Education: [x] Verbal form, core engagement [x] Demo  new ex's per flowsheet above [] Written  Comprehension of Education:  [x] Verbalizes understanding.  [x] Demonstrates understanding.  [x] Needs review.for compliance   [x] Demonstrates/verbalizes HEP/Ed previously given.     Access Code: [email or web address]  URL: [email or web address]  Date: 07/03/2024  Prepared by: Merced Zarco  Exercises  - Supine Lower Trunk Rotation  - 1 x daily - 7 x weekly - 3 sets - 10 reps  - Supine Single Knee to Chest Stretch  - 1 x daily - 7 x weekly - 3 sets - 10 reps  - Dead Bug  - 1 x daily - 7 x weekly - 3 sets - 10 reps  - Seated Upper Trapezius Stretch  - 1 x daily - 7 x weekly - 3 sets - 10 reps  Date: 07/08/2024  Prepared by: Angelique Jeffries  - Seated Scapular Retraction  - 1 x daily - 7 x weekly - 1 sets - 10 reps - 5sec hold  - Gentle Levator Scapulae Stretch  - 1 x daily - 7 x weekly - 3 sets - 30sec hold  - Seated Cervical Retraction  - 1 x daily - 7 x weekly - 1 sets - 10 reps - 5sec hold  Date: 07/23/2024- provided blue tband   Prepared by: Angelique Jeffries  - Clamshell with Resistance  - 1 x daily - 7 x weekly - 2 sets - 10 reps  - Sidelying Hip Abduction with Resistance at Thighs  - 1 x daily - 7 x weekly - 2 sets - 10 reps  - Supine Bridge with Resistance Band  - 1 x daily - 7 x weekly - 2 sets - 10 reps - 5 sec hold  - Supine Dead Bug with Leg Extension  - 1 x daily - 7 x weekly - 1 sets - 10 reps      Plan: [x] Continue current frequency toward long and short term goals.    [] Specific Instructions for subsequent treatments: UPDATE HEP      Time In: 2:12 pm         Time Out: 3:05 pm    Electronically signed by:  Rob Garcia PTA

## 2024-09-12 ENCOUNTER — HOSPITAL ENCOUNTER (OUTPATIENT)
Dept: PHYSICAL THERAPY | Facility: CLINIC | Age: 19
Setting detail: THERAPIES SERIES
Discharge: HOME | End: 2024-09-12
Payer: OTHER MISCELLANEOUS

## 2024-09-12 PROCEDURE — 97140 MANUAL THERAPY 1/> REGIONS: CPT

## 2024-09-12 PROCEDURE — 97110 THERAPEUTIC EXERCISES: CPT

## 2025-03-14 ENCOUNTER — APPOINTMENT (OUTPATIENT)
Dept: CT IMAGING | Age: 20
End: 2025-03-14
Payer: OTHER MISCELLANEOUS

## 2025-03-14 ENCOUNTER — APPOINTMENT (OUTPATIENT)
Dept: GENERAL RADIOLOGY | Age: 20
End: 2025-03-14
Payer: OTHER MISCELLANEOUS

## 2025-03-14 ENCOUNTER — HOSPITAL ENCOUNTER (EMERGENCY)
Age: 20
Discharge: HOME | End: 2025-03-14
Attending: EMERGENCY MEDICINE
Payer: OTHER MISCELLANEOUS

## 2025-03-14 VITALS
SYSTOLIC BLOOD PRESSURE: 131 MMHG | RESPIRATION RATE: 20 BRPM | HEART RATE: 78 BPM | BODY MASS INDEX: 22.9 KG/M2 | DIASTOLIC BLOOD PRESSURE: 73 MMHG | HEIGHT: 70 IN | OXYGEN SATURATION: 94 % | TEMPERATURE: 97.6 F | WEIGHT: 160 LBS
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
## 2025-03-14 PROCEDURE — 70450 CT HEAD/BRAIN W/O DYE: CPT

## 2025-03-14 PROCEDURE — 73080 X-RAY EXAM OF ELBOW: CPT

## 2025-03-14 PROCEDURE — 73562 X-RAY EXAM OF KNEE 3: CPT

## 2025-03-14 PROCEDURE — 6360000002 HC RX W HCPCS

## 2025-03-14 PROCEDURE — 73130 X-RAY EXAM OF HAND: CPT

## 2025-03-14 PROCEDURE — 96374 THER/PROPH/DIAG INJ IV PUSH: CPT

## 2025-03-14 PROCEDURE — 71045 X-RAY EXAM CHEST 1 VIEW: CPT

## 2025-03-14 PROCEDURE — 90715 TDAP VACCINE 7 YRS/> IM: CPT

## 2025-03-14 PROCEDURE — 90471 IMMUNIZATION ADMIN: CPT

## 2025-03-14 PROCEDURE — 72125 CT NECK SPINE W/O DYE: CPT

## 2025-03-14 PROCEDURE — 99284 EMERGENCY DEPT VISIT MOD MDM: CPT

## 2025-03-14 RX ORDER — IBUPROFEN 600 MG/1
600 TABLET, FILM COATED ORAL EVERY 8 HOURS PRN
Qty: 30 TABLET | Refills: 0 | Status: SHIPPED | OUTPATIENT
Start: 2025-03-14

## 2025-03-14 RX ORDER — FENTANYL CITRATE 50 UG/ML
50 INJECTION, SOLUTION INTRAMUSCULAR; INTRAVENOUS ONCE
Status: COMPLETED | OUTPATIENT
Start: 2025-03-14 | End: 2025-03-14

## 2025-03-14 RX ADMIN — TETANUS TOXOID, REDUCED DIPHTHERIA TOXOID AND ACELLULAR PERTUSSIS VACCINE, ADSORBED 0.5 ML: 5; 2.5; 8; 8; 2.5 SUSPENSION INTRAMUSCULAR at 00:30

## 2025-03-14 RX ADMIN — FENTANYL CITRATE 50 MCG: 50 INJECTION, SOLUTION INTRAMUSCULAR; INTRAVENOUS at 00:22

## 2025-03-14 ASSESSMENT — ENCOUNTER SYMPTOMS
GASTROINTESTINAL NEGATIVE: 1
EYES NEGATIVE: 1
RESPIRATORY NEGATIVE: 1

## 2025-03-14 ASSESSMENT — PAIN - FUNCTIONAL ASSESSMENT: PAIN_FUNCTIONAL_ASSESSMENT: 0-10

## 2025-03-14 ASSESSMENT — PAIN SCALES - GENERAL
PAINLEVEL_OUTOF10: 8
PAINLEVEL_OUTOF10: 8

## 2025-03-14 ASSESSMENT — PAIN DESCRIPTION - ORIENTATION: ORIENTATION: RIGHT;LEFT
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
